# Patient Record
(demographics unavailable — no encounter records)

---

## 2024-10-08 NOTE — ASSESSMENT
[FreeTextEntry1] : Ms. STEFANI RIVERA is an 86 year female, ECOG 0 (Independent), never smoker, with PMHX: HTN, HF, AFIB on Xarelto (7 years), Right Breast Cancer, s/p mastectomy and reconstruction with muscle flap from abdomen, tx with chemo (1995), Lymphoma tx with chemo 2010, ruptured bowel in 2022, s/p colostomy (via Dr. Palencia). Patient was referred by pulmonologist, Dr. Caraballo for consultation for a RLL lung density 1.5 X 1.3 cm FDG avid on PET/CT. Patient presented on 9/23/24 for robotic assisted right VATs for Right lower lobe wedge resection, MLND, and R chest tube placement. Post-operatively in PACU, patient was in afib with HR in the 110's requiring 2x IV metoprolol 5mg and home diltiazem given. HR improved to 60-80's. Patient restarted on home metoprolol and diltiazem to start the following morning. Patient became bradycardic during the day on POD1 with HR 40's, BP 80 systolic. Patient asymptomatic at that time. Patient bolused 250 cc LR. HR increased to 50s, systolic 90's. Bradycardia/hypotension likely 2/2 patient receiving home dose of diltiazem in AM after receiving a dose the afternoon prior, effectively overdosing patient. Cardiology consulted, recommended holding diltiazem and metoprolol and to give calcium gluconate, which was done. Overnight, HR was in the high 50-70s, -110's systolic. Cardiology re-consulted regarding restarting home medications. Recommended: holding Aldactone until potassium <4.5, trial of short acting diltiazem and if patient tolerates, transitioning back to long acting on discharge as well as resuming Toprol 50 BID. Patient tolerated short acting diltiazem and patient restarted on long acting diltiazem on D/C.mChest tube was d/c'd POD 1. Post pull CXR showed unchanged right apical pneumothorax. Patient weaned off NC. BMP POD1 showed hyperkalemia to 5.7. EKG done which showed afib/flutter, HR 72. Patient asymptomatic. Repeat BMP 4.8. No intervention required. At time of discharge, patient's pain was well controlled, tolerating diet, and medically stable for discharge. K on day of discharge 4.5, so home Aldactone discontinued.  Chest Xray obtained prior to visit. Images and path reviewed and discussed revealing focal granulomatous inflammation with necrosis, langhan-type multinucleate giant cells reaction and surrounding fibrosis.  -Plan CT Chest in  F/U CTS F/U Pulm: Dr. Caraballo for continued management  I, Shawn Mcguire saw, examined and reviewed the diagnostic images on patient:  STEFANI RIVERA on 10/08/2024 and agreed with my Nurse Practitioner's clinical note, physical exam findings and treatment plan. Patient presented for postoperative follow-up.  She underwent robotic assisted right thoracoscopy right lower lobe wedge resection and mediastinal lymph node dissection.  Procedure date 9/23/2024.  Procedure without complications.  Patient is recovering well, surgical wounds are healing fine.  Chest x-ray with no abnormal findings.  Pathology report reviewed with the patient: No evidence of malignancy.  Benign lung parenchyma showing focal granulomatous inflammation with necrosis, lung and type multinucleated giant cell.  No additional intervention indicated.  Patient is to continue care with pulmonary Dr. Caraballo.

## 2024-10-08 NOTE — REASON FOR VISIT
[de-identified] : 9/23/2024 [de-identified] : S/P robotic assisted right VATs for Right lower lobe wedge resection, MLND, post op visit, Path Results: No Malignancy

## 2024-10-08 NOTE — COUNSELING
[FreeTextEntry1] :  Ms. STEFANI RIVERA 86 year F   , S/P Lung Resection.  Incisions healing well and all sutures were removed. Incision site care was reviewed. No lotions, perfumes to the incision site. On arrival patient denies fever, chills nausea, and vomiting. Denies SOB or palpitation. All questions and concerns were addressed.  Medications were reviewed with the patient. Pain needs addressed.  Advised no flying or lifting anything >10lbs for at least 6 weeks post thoracic surgery.

## 2024-10-08 NOTE — REASON FOR VISIT
[de-identified] : 9/23/2024 [de-identified] : S/P robotic assisted right VATs for Right lower lobe wedge resection, MLND, post op visit, Path Results: No Malignancy

## 2024-10-30 NOTE — PHYSICAL EXAM
[General Appearance - Well Developed] : well developed [Normal Appearance] : normal appearance [Well Groomed] : well groomed [General Appearance - Well Nourished] : well nourished [No Deformities] : no deformities [General Appearance - In No Acute Distress] : no acute distress [Normal Conjunctiva] : the conjunctiva exhibited no abnormalities [Eyelids - No Xanthelasma] : the eyelids demonstrated no xanthelasmas [Normal Oral Mucosa] : normal oral mucosa [No Oral Pallor] : no oral pallor [No Oral Cyanosis] : no oral cyanosis [Normal Jugular Venous A Waves Present] : normal jugular venous A waves present [Normal Jugular Venous V Waves Present] : normal jugular venous V waves present [No Jugular Venous Farooq A Waves] : no jugular venous farooq A waves [Heart Sounds] : normal S1 and S2 [Murmurs] : no murmurs present [Irregularly Irregular] : the rhythm was irregularly irregular [Systolic grade ___/6] : A grade [unfilled]/6 systolic murmur was heard. [Respiration, Rhythm And Depth] : normal respiratory rhythm and effort [Exaggerated Use Of Accessory Muscles For Inspiration] : no accessory muscle use [Auscultation Breath Sounds / Voice Sounds] : lungs were clear to auscultation bilaterally [Bowel Sounds] : normal bowel sounds [Abdomen Soft] : soft [Abdomen Tenderness] : non-tender [Abdomen Mass (___ Cm)] : no abdominal mass palpated [Abnormal Walk] : normal gait [Nail Clubbing] : no clubbing of the fingernails [Cyanosis, Localized] : no localized cyanosis [Petechial Hemorrhages (___cm)] : no petechial hemorrhages [Skin Color & Pigmentation] : normal skin color and pigmentation [] : no rash [No Venous Stasis] : no venous stasis [Skin Lesions] : no skin lesions [No Skin Ulcers] : no skin ulcer [No Xanthoma] : no  xanthoma was observed [Oriented To Time, Place, And Person] : oriented to person, place, and time [FreeTextEntry1] : colostomy

## 2024-11-06 NOTE — PLAN
[TextEntry] : told to use lasix 20 mg daily  will arrange for thoracentesis next thursday by Dr gardner  told daughter need to stop Xarelto 48 hrs prior  patient was counseled to use the machine every night at least 4 hrs  also counseled for weight loss and exercise follow ct surgery  need ct scan in 2-3 months

## 2024-11-06 NOTE — HISTORY OF PRESENT ILLNESS
[TextBox_4] : she is s/p lung resection for RLL nodule some neuroendocrine picture  she was admitted to hospital 2 weeks ago for sob and pleural effusion  s/p thoracentesis borderline exudate  cytology negative  was d/c on lasix taking it every other day 20 mg  she feel good no sob  cxr today recurrence of the effusion result discussed with patient and daughter offered to repeat thoracentesis next week

## 2024-11-06 NOTE — PHYSICAL EXAM
[No Acute Distress] : no acute distress [Normal Oropharynx] : normal oropharynx [Normal Appearance] : normal appearance [No Neck Mass] : no neck mass [Normal Rate/Rhythm] : normal rate/rhythm [Normal S1, S2] : normal s1, s2 [No Murmurs] : no murmurs [No Resp Distress] : no resp distress [No Abnormalities] : no abnormalities [Normal Gait] : normal gait [No Clubbing] : no clubbing [No Cyanosis] : no cyanosis [No Edema] : no edema [FROM] : FROM [No Focal Deficits] : no focal deficits [Oriented x3] : oriented x3 [Normal Affect] : normal affect [TextBox_68] : decrease RIght lower  [TextBox_89] : colostomy

## 2024-12-02 NOTE — DISCUSSION/SUMMARY
[EKG obtained to assist in diagnosis and management of assessed problem(s)] : EKG obtained to assist in diagnosis and management of assessed problem(s) No Decelerations

## 2024-12-06 NOTE — PHYSICAL EXAM
[No Acute Distress] : no acute distress [Normal Oropharynx] : normal oropharynx [Normal Appearance] : normal appearance [No Neck Mass] : no neck mass [Normal Rate/Rhythm] : normal rate/rhythm [Normal S1, S2] : normal s1, s2 [No Murmurs] : no murmurs [No Abnormalities] : no abnormalities [Normal Gait] : normal gait [No Clubbing] : no clubbing [No Cyanosis] : no cyanosis [No Edema] : no edema [FROM] : FROM [No Focal Deficits] : no focal deficits [Oriented x3] : oriented x3 [Normal Affect] : normal affect [TextBox_68] : decrease RIght lower  [TextBox_89] : colostomy

## 2024-12-06 NOTE — REVIEW OF SYSTEMS
[GERD] : gerd [Negative] : Endocrine [TextBox_14] : see HPI  [TextBox_30] : see HPI  [TextBox_69] : see hPI

## 2024-12-06 NOTE — HISTORY OF PRESENT ILLNESS
[TextBox_4] : Patient came today for follow-up with her daughter she status post thoracentesis 2 weeks ago chest x-ray improved significantly after the thoracentesis fluid cytology negative , patient been taking Lasix 2-3 times a week 20 mg orally said that not able to take more because of low blood pressure and she did follow with her cardiology who recommend to stay on the current dose I did discuss the case with her cardiology Devin recommend to try to increase the Lasix oral daily and lower other blood pressure medication Patient now complaining of worsening shortness of breath and orthopnea chest x-ray done today worsening and recurrent of the right pleural effusion.  I discussed the treatment option and the neck step with the daughter and the patient told we can proceed with a VATS for biopsy and decortication, versus proceed with Pleurx and sent fluid for cytology again both patient and daughter does not want any surgical intervention but they agree for the Pleurx catheter
[TextBox_4] : Patient came today for follow-up with her daughter she status post thoracentesis 2 weeks ago chest x-ray improved significantly after the thoracentesis fluid cytology negative , patient been taking Lasix 2-3 times a week 20 mg orally said that not able to take more because of low blood pressure and she did follow with her cardiology who recommend to stay on the current dose I did discuss the case with her cardiology Devin recommend to try to increase the Lasix oral daily and lower other blood pressure medication Patient now complaining of worsening shortness of breath and orthopnea chest x-ray done today worsening and recurrent of the right pleural effusion.  I discussed the treatment option and the neck step with the daughter and the patient told we can proceed with a VATS for biopsy and decortication, versus proceed with Pleurx and sent fluid for cytology again both patient and daughter does not want any surgical intervention but they agree for the Pleurx catheter
Never smoker

## 2024-12-06 NOTE — PLAN
[TextEntry] : continue lasix consider to take every day  follow cardiology  s/p thoracentesis twice cytology negative  discussed with patient and daughter because of recurrent effusion can proceed with pleurx catheter , VS referred for VATS  fluid look more coming from hear ,but told patient and daughter even though cytology negative still malignancy in diff diagnosis  daughter does not want any surgical intervention agreed for pleurx cath  i explained the complication of the catheter such as bleed , infection  home care consult for visiting nurse for pleurx drainage  patient scheduled for pleurx in 2 days by Dr Wilkerson  told to hold AC xarelto , last time she took yetserday  will do ct scan after pleurx cath  follow up in 2 weeks

## 2024-12-18 NOTE — PROCEDURE
[FreeTextEntry1] : Reason : effusion  Overall Findings:  View Ap and lateral     Lung Fields:  Normal Lung Markings                   No Infiltrate  Pleura:decrease  right effusion  pleurx cath in place                Comparison Films        Improved               final Interpretation: decrease rigth effusion

## 2024-12-18 NOTE — HISTORY OF PRESENT ILLNESS
[TextBox_4] : Coming for follow-up status post pleurx catheter placement 2 weeks ago at the beginning was draining close to 1000 cc every other day now it is less around 800 cc every time it get drained. Fluid analysis was sent again and it is transudate cytology negative patient supposed to be on Lasix every day but she is not able to take it because the doctor she hold because of systolic blood pressure will be around 90 Patient on Cardizem to 240 daily in addition to metoprolol 50 every 12 hours chest x-ray done today small effusion right side last time was drained yesterday around 800 cc Suture was removed from 1 side, she still have another suture close to the catheter

## 2024-12-18 NOTE — PLAN
[TextEntry] : continue lasix need to take it daily  waiting Dr Doyle call back if possible to lower either diltiazem or metoprolol because of borderline low BP so she can take lasix daily  follow up in 2 weeks to remove the remainining suture  keep draining every other day  possible order ct nexxt visit  patient was counseled to use the machine every night at least 4 hrs  also counseled for weight loss and exercise addendum note case discussed with Dr Doyle recommend to lower metoprolol to 50 mg at night only  daughter was informed

## 2024-12-20 NOTE — REVIEW OF SYSTEMS
[Shortness Of Breath] : no shortness of breath [Wheezing] : no wheezing [Cough] : no cough [SOB on Exertion] : shortness of breath during exertion [Negative] : Heme/Lymph

## 2024-12-20 NOTE — HISTORY OF PRESENT ILLNESS
[Disease: _____________________] : Disease: [unfilled] [T: ___] : T[unfilled] [N: ___] : N[unfilled] [M: ___] : M[unfilled] [AJCC Stage: ____] : AJCC Stage: [unfilled] [de-identified] : Presented accompanied very supportive daughter.  Reporting feeling well overall.  Denied having chest pain, shortness of breath swelling in upper or lower extremities, no headaches, visual changes, difficulty with ambulation.  Denied having any B symptoms.   86-year-old woman with extensive medical history including paroxysmal atrial fibrillation. mitral and aortic regurgitation.  Patient is being followed by cardiology.  Of note, patient reported history of right breast cancer diagnosed in 1995 at [facility].  She underwent MRM followed by chemotherapy.  Subsequently, she received Tamoxifen for 5 years.  In 2009, patient was diagnosed with non-Hodgkin's lymphoma.  Unknown pathology or stage.  Reported that she had received 6 cycles of chemotherapy at [facility].  Subsequently, she was followed by private oncology Dr. Richardson.  Reported as ERIC.  2/2024: CT chest: Bilateral innumerable small nodules grossly similar to prior exam. Branching tubular opacity in the right lower lobe similar to prior exam likely reflecting mucoid impaction.  Findings most likely be postinfectious/inflammatory.   8/2024: CT Chest: Branching tubular opacity within the right lower lobe which may reflect mucoid impaction. Developing areas of nodularity, increased in size from the prior exam which could be inflammatory in nature. Findings may reflect increasing distal airway disease. Developing neoplastic process cannot be excluded.   9/2024 - 10/2024: Evaluated by pulmonary Dr. Caraballo and cardiothoracic surgery Dr. Clyde Hall. Lung, right lower lobe wedge for frozen section: Benign lung parenchyma showing focal granulomatous inflammation with necrosis, Langhan-type multinucleate giant cells reaction and surrounding fibrosis. The lung parenchyma also showing emphysematous change with few foci of tumorlet (measuring 2 mm in greatest dimension, on slides 1 FSA and 1B).  Multiple lung nodule sampling. No histologic evidence of carcinoma or malignancy seen.  Addendum report: IHC Stains were performed on block 1 FSA and revealed the tumor-lets positive for chromogranin, synaptophysin, and CD56 with JEREMY 67 index less than 2%.  9/2024, PET scan showed pathologic FDG uptake (SUV 3.6; avid on nonattenuation corrected images) co-registering with 1.5 x 1.3 cm right lower lobe pulmonary density suspicious for biologic tumor activity (slight misregistration between CT and emission imaging). This is new compared to previous FDG PET CT study performed for lymphoma on 8/12/2013. No other definite sites of pathologic FDG uptake. Sub centimeter right upper lobe and left lower lobe left upper lobe and lingular pulmonary nodules are not FDG avid. No other sites of abnormal FDG uptake.  10/2024:   Developed progressive shortness of breath.  Noted pleural effusion by images.  Patient underwent Pleurx catheter placement. Per pulmonary note, suspicious for transudative fluid.  10/24/2024:  Fluid cytology showed no malignant cells.  Presence of histiocytes and small lymphocytes.  11/14/2024:  Fluid cytology showed no carcinoma identified.  Mainly small lymphoid cells.  12/5/2024:   No malignant cells identified.  Few mesothelial cells, histiocytes, small lymphocytes. [Date: ____________] : Patient's last distress assessment performed on [unfilled]. [1 - Distress Level] : Distress Level: 1 [ECOG Performance Status: 2 - Ambulatory and capable of all self care but unable to carry out any work activities] : Performance Status: 2 - Ambulatory and capable of all self care but unable to carry out any work activities. Up and about more than 50% of waking hours

## 2024-12-20 NOTE — ASSESSMENT
[FreeTextEntry1] : 86-year-old woman with extensive medical history including paroxysmal atrial fibrillation. mitral and aortic regurgitation.  Patient is being followed by cardiology.  Of note, patient reported history of right breast cancer diagnosed in 1995 at [facility].  She underwent MRM followed by chemotherapy.  Subsequently, she received Tamoxifen for 5 years.  In 2009, patient was diagnosed with non-Hodgkin's lymphoma.  Unknown pathology or stage.  Reported that she had received 6 cycles of chemotherapy at [facility].  Subsequently, she was followed by private oncology Dr. Richardson.  Reported as ERIC.  9/2024 - 10/2024: Evaluated by pulmonary Dr. Caraballo and cardiothoracic surgery Dr. Clyde Hall.  CT scans were concerning for malignancy. PET scan showed pathologic FDG uptake (SUV 3.6; avid on nonattenuation corrected images) co-registering with 1.5 x 1.3 cm right lower lobe pulmonary density suspicious for biologic tumor activity (slight misregistration between CT and emission imaging). This is new compared to previous FDG PET CT study performed for lymphoma on 8/12/2013. No other definite sites of pathologic FDG uptake. Sub centimeter right upper lobe and left lower lobe left upper lobe and lingular pulmonary nodules are not FDG avid. No other sites of abnormal FDG uptake.  Lung, right lower lobe wedge for frozen section: Benign lung parenchyma showing focal granulomatous inflammation with necrosis, Langhan-type multinucleate giant cells reaction and surrounding fibrosis. The lung parenchyma also showing emphysematous change with few foci of tumor-let (measuring 2 mm in greatest dimension, on slides 1 FSA and 1B).  Multiple lung nodule sampling. No histologic evidence of carcinoma or malignancy seen.  Addendum report: IHC Stains were performed on block 1 FSA and revealed the tumor-lets positive for chromogranin, synaptophysin, and CD56 with JEREMY 67 index less than 2%.  Developed progressive shortness of breath.  Noted pleural effusion by images.  Patient underwent Pleurx catheter placement. Per pulmonary note, suspicious for transudative fluid. Repeat fluid sampling: 10/24/2024:  Fluid cytology showed no malignant cells.  Presence of histiocytes and small lymphocytes. 11/14/2024:  Fluid cytology showed no carcinoma identified.  Mainly small lymphoid cells. 12/5/2024:   No malignant cells identified.  Few mesothelial cells, histiocytes, small lymphocytes.   Today, patient reported feeling well.  Denied having shortness of breath, chest pain.  No B symptoms.  -Educated to return to ER if chest pain, progressive shortness of breath, swelling in upper or lower extremities -Labs (CBC, CMP, LDH) -Discussed with pulmonary to send pleural fluid flow cytometry given history of non-Hodgkin's lymphoma -PET scan 1/2025 -Cardiology follow-up -Pulmonary follow-up -RTC 1/2025 after images  Total time spent chart reviewing and medical care: 60 minutes [Future Reassessment of Pain Scale] : Future reassessment of pain scale    [Supportive] : Goals of care discussed with patient: Supportive [With Patient/Caregiver] : With Patient/Caregiver [AdvancecareDate] : 12/20/2024 [Full Code] : full code

## 2024-12-20 NOTE — PHYSICAL EXAM
[Thin] : thin [Normal] : no peripheral adenopathy appreciated [de-identified] : b/l rales/crackles

## 2024-12-31 NOTE — PROCEDURE
[FreeTextEntry1] : Reason :pleural effusion  Overall Findings:  View Ap and lateral   increase right effusion  pleurx cath in place     final Interpretation: right effusion /opacity

## 2024-12-31 NOTE — HISTORY OF PRESENT ILLNESS
[TextBox_4] : Patient coming for follow-up the Pleurx catheter did not drain every other day the last drainage was 250 cc which was 2 days ago the one before was 500 and the one  before 850 Patient not taking the Lasix at all because that blood pressure in the morning is low Today blood pressure was around 20 I told the son to try to take the Lasix around noon and to follow with her cardiology now she is off metoprolol Today suture was removed dressing change also sample of fluid was taking to be sent for flow by the oncology team as per the request Around 750 cc of fluid was drained today by me

## 2024-12-31 NOTE — PLAN
[TextEntry] : contain pleurx drainage Q48 hrs  again told to try to take lasix around noon if bP stable  follow cardiology  sutured reomved today  sample of fluid  was taken will send for oncology team to send for FLow cyto as per the oncology recommendation  Follow-up in 1 months

## 2025-06-18 NOTE — HISTORY OF PRESENT ILLNESS
[TextBox_4] : Patient came today from nursing home for a follow-up after long hospitalization because of bilateral effusion and empyema right side status post antibiotic treatment and then she had a Pleurx catheter placed on the left side Patient now has bilateral Pleurx catheter the left been draining every other day around 400 to 600 cc The last time they drained the right side as per the daughter been couple of weeks Chest x-ray done today overall small bilateral effusion the right side overall stable for at least 3 weeks no increase of the fluid I think the fluid that she had now is more loculated Discussed with the daughter that we should consider remove the right catheter in 2 to 3 weeks

## 2025-06-18 NOTE — PHYSICAL EXAM
[No Acute Distress] : no acute distress [Normal Appearance] : normal appearance [No Resp Distress] : no resp distress [Clear to Auscultation Bilaterally] : clear to auscultation bilaterally [Oriented x3] : oriented x3

## 2025-06-18 NOTE — PLAN
[TextEntry] : Continue diuretics as per the cardiology Will recommend to keep draining the left pleural catheter every other day Drain the right catheter tomorrow if it did not drain anything we will try again next week if did not drain then we will arrange to remove the catheter As x-rays look like stable loculated effusion not getting worse On home oxygen to keep pulse ox above 92%

## 2025-06-18 NOTE — PROCEDURE
[FreeTextEntry1] : Reason : effusion  Overall Findings:  View Ap and lateral     Lung Fields:  Normal Lung Markings                   No Infiltrate  Pleura: small b/l effusion psitive b/l pleurx cathetr                    Comparison Films      Improved             final Interpretation:  b/l effusion

## 2025-06-18 NOTE — REVIEW OF SYSTEMS
[GERD] : gerd [Nocturia] : nocturia [Negative] : Endocrine [TextBox_14] : see HPI  [TextBox_30] : see HPI  [TextBox_69] : see hPI